# Patient Record
Sex: FEMALE | URBAN - METROPOLITAN AREA
[De-identification: names, ages, dates, MRNs, and addresses within clinical notes are randomized per-mention and may not be internally consistent; named-entity substitution may affect disease eponyms.]

---

## 2019-02-27 ENCOUNTER — OFFICE VISIT (OUTPATIENT)
Dept: URBAN - METROPOLITAN AREA CLINIC 23 | Facility: CLINIC | Age: 49
End: 2019-02-27

## 2019-02-27 DIAGNOSIS — H16.293 OTHER KERATOCONJUNCTIVITIS, BILATERAL: Primary | ICD-10-CM

## 2019-02-27 PROCEDURE — 92002 INTRM OPH EXAM NEW PATIENT: CPT | Performed by: OPHTHALMOLOGY

## 2019-02-27 ASSESSMENT — KERATOMETRY
OS: 43.95
OD: 43.60

## 2019-02-27 ASSESSMENT — INTRAOCULAR PRESSURE
OS: 14
OD: 13

## 2019-02-27 NOTE — IMPRESSION/PLAN
Impression: Other keratoconjunctivitis, bilateral: G17.130. Condition: quality of life issue. Symptoms: will treat with meds. Plan: Discussed diagnosis in detail with patient. Discussed treatment options with patient. Discussed risks of progression. Patient instructed to use artificial tears as needed. Patient advised to avoid fans, use humidifier, take oral omega-3. Observe.